# Patient Record
Sex: FEMALE | Race: WHITE | Employment: FULL TIME | URBAN - METROPOLITAN AREA
[De-identification: names, ages, dates, MRNs, and addresses within clinical notes are randomized per-mention and may not be internally consistent; named-entity substitution may affect disease eponyms.]

---

## 2017-06-19 ENCOUNTER — HOSPITAL ENCOUNTER (OUTPATIENT)
Dept: SLEEP MEDICINE | Age: 32
Discharge: HOME | End: 2017-06-19
Payer: COMMERCIAL

## 2017-06-19 ENCOUNTER — OFFICE VISIT (OUTPATIENT)
Dept: SLEEP MEDICINE | Age: 32
End: 2017-06-19

## 2017-06-19 VITALS
HEIGHT: 69 IN | WEIGHT: 293 LBS | HEART RATE: 90 BPM | SYSTOLIC BLOOD PRESSURE: 153 MMHG | OXYGEN SATURATION: 96 % | DIASTOLIC BLOOD PRESSURE: 87 MMHG | TEMPERATURE: 98.2 F | BODY MASS INDEX: 43.4 KG/M2

## 2017-06-19 DIAGNOSIS — E66.01 MORBID OBESITY WITH BMI OF 50.0-59.9, ADULT (HCC): ICD-10-CM

## 2017-06-19 DIAGNOSIS — G47.33 OBSTRUCTIVE SLEEP APNEA (ADULT) (PEDIATRIC): Primary | ICD-10-CM

## 2017-06-19 PROBLEM — J45.20 MILD INTERMITTENT ASTHMA WITHOUT COMPLICATION: Status: ACTIVE | Noted: 2017-06-19

## 2017-06-19 PROBLEM — F99 PSYCHIATRIC DISORDER: Status: ACTIVE | Noted: 2017-06-19

## 2017-06-19 PROCEDURE — 95806 SLEEP STUDY UNATT&RESP EFFT: CPT

## 2017-06-19 RX ORDER — LAMOTRIGINE 50 MG/1
100 TABLET, EXTENDED RELEASE ORAL DAILY
COMMUNITY

## 2017-06-19 RX ORDER — METFORMIN HYDROCHLORIDE 500 MG/1
TABLET ORAL 2 TIMES DAILY WITH MEALS
COMMUNITY
End: 2019-05-16 | Stop reason: CLARIF

## 2017-06-19 RX ORDER — TRAZODONE HYDROCHLORIDE 50 MG/1
TABLET ORAL
COMMUNITY
End: 2019-05-16 | Stop reason: CLARIF

## 2017-06-19 RX ORDER — ACETAMINOPHEN AND CODEINE PHOSPHATE 120; 12 MG/5ML; MG/5ML
SOLUTION ORAL
COMMUNITY
End: 2019-05-16 | Stop reason: CLARIF

## 2017-06-19 NOTE — PROGRESS NOTES
[address] [facility] [place]. [place], [address]  Tel.  [phone]  Fax. 100 [facility] 60  [place], [address] [facility]  Tel.  [phone]  Fax. [phone] [address] Denise Rae [address]  Tel.  [phone]  Fax. [phone]         Subjective:      Gabino Barahona is an 28 y.o. female referred by Deb Patel,  for evaluation for a sleep disorder. She complains of snoring, snorting, choking, periods of not breathing associated with excessive daytime sleepiness. Symptoms began 10 years ago, gradually worsening since that time. She usually can fall asleep in 30+ minutes. Family or house members note snoring, periods of not breathing. She denies falling asleep while driving. Gabino Barahona does wake up frequently at night. She is bothered by waking up too early and left unable to get back to sleep. She actually sleeps about 7 hours at night and wakes up about 3 times during the night. She does not work shifts:  .   Marlon Cerda indicates she does get too little sleep at night. Her bedtime is 2230. She awakens at 0700. She does take naps. She takes 7 naps a week lasting 30 to 45. She has the following observed behaviors: Loud snoring, Light snoring, Pauses in breathing, Head rocking or banging, Twitching of legs or feet;  . Other remarks: waking with a gasp or snort  She was diagnosed with sleep apnea 10 years ago but did not tolerate the CPAP as she felt that the masks gave her nightmares  [place] Sleepiness Score: 13   which reflect mild daytime drowsiness. No Known Allergies      Current Outpatient Prescriptions:     lamoTRIgine (LAMICTAL XR) 50 mg tr24 ER tablet, Take  by mouth daily. , Disp: , Rfl:     metFORMIN (GLUCOPHAGE) 500 mg tablet, Take  by mouth two (2) times daily (with meals). , Disp: , Rfl:     norethindrone (MICRONOR) 0.35 mg tab, Take  by mouth., Disp: , Rfl:     LEVOTHYROXINE SODIUM (LEVOTHYROXINE PO), Take  by mouth., Disp: , Rfl:     traZODone (DESYREL) 50 mg tablet, Take  by mouth nightly., Disp: , Rfl:     lurasidone (LATUDA) 40 mg tab tablet, Take  by mouth., Disp: , Rfl:     VITAMIN B COMPLEX (B-COMPLEX PO), Take  by mouth., Disp: , Rfl:     VIT B12/FOLIC/B6 MHAN/T2/ZCER (METHYL-MAX PO), Take  by mouth., Disp: , Rfl:      She  has a past medical history of ADHD (attention deficit hyperactivity disorder); PCOS (polycystic ovarian syndrome); Psychiatric disorder; and Thyroid activity decreased. She  has a past surgical history that includes abdomen surgery proc unlisted. She family history includes Asthma in her father; COPD in her father; Depression in her father and mother; Diabetes in her father and mother; Hypertension in her father. She  reports that she has quit smoking. She does not have any smokeless tobacco history on file. She reports that she drinks alcohol. Review of Systems:  Constitutional:  +weight gain. Eyes:  No blurred vision. CVS:  No significant chest pain  Pulm:  No significant shortness of breath  GI:  No significant nausea or vomiting  :  No significant nocturia  Musculoskeletal:  No significant joint pain at night  Skin:  No significant rashes  Neuro:  No significant dizziness   Psych: bipolar controlled    Sleep Review of Systems: notable for + difficulty falling asleep; +frequent awakenings at night;  regular dreaming noted; no nightmares ; no early morning headaches; no memory problems; no concentration issues; no history of any automobile or occupational accidents due to daytime drowsiness.       Objective:     Visit Vitals    /87    Pulse 90    Temp 98.2 °F (36.8 °C)    Ht 5' 9\" (1.753 m)    Wt (!) 385 lb (174.6 kg)    SpO2 96%    BMI 56.85 kg/m2         General:   Not in acute distress   Eyes:  Anicteric sclerae, no obvious strabismus   Nose:  No obvious nasal septum deviation    Oropharynx:   Class 3 oropharyngeal outlet, thick tongue base, enlarged and boggy uvula, low-lying soft palate, narrow tonsilo-pharyngeal pilars   Tonsils:   tonsils are present and normal   Neck:   Neck circ. in \"inches\": 17; midline trachea   Chest/Lungs:  Equal lung expansion, clear on auscultation    CVS:  Normal rate, regular rhythm; no JVD   Skin:  Warm to touch; no obvious rashes   Neuro:  No focal deficits ; no obvious tremor    Psych:  Normal affect,  normal countenance;          Assessment:       ICD-10-CM ICD-9-CM    1. Obstructive sleep apnea (adult) (pediatric) G47.33 327.23 SLEEP STUDY UNATTENDED, 4 CHANNEL   2. Morbid obesity with BMI of 50.0-59.9, adult ([facility]) E66.01 278.01     Z68.43 V85.43          Plan:     * The patient currently has a Moderate Risk for having sleep apnea. STOP-BANG score 5.  * PSG was ordered for initial evaluation. she  prefers a home sleep apnea test.  * She was provided information on sleep apnea including coresponding risk factors and the importance of proper treatment. * Counseling was provided regarding proper sleep hygiene and safe driving. * I will call her with the results. Treatment options for sleep apnea were reviewed. she is not against a trial of PAP if found to have significant sleep apnea. 2. Obesity - I have counseled the patient regarding the benefits of weight loss. Thank you for allowing us to participate in your patient's medical care. We'll keep you updated on these investigations.   Opal Girard MD  Diplomate in Sleep Medicine  [facility]

## 2017-06-19 NOTE — PATIENT INSTRUCTIONS
217 [facility] [place]. [place], [address]  Tel.  [phone]  Fax. 100 [facility] 60  [place], [address] [facility]  Tel.  [phone]  Fax. [phone] [address]  Tel.  [phone]  Fax. [phone]     Sleep Apnea: After Your Visit  Your Care Instructions  Sleep apnea occurs when you frequently stop breathing for 10 seconds or longer during sleep. It can be mild to severe, based on the number of times per hour that you stop breathing or have slowed breathing. Blocked or narrowed airways in your nose, mouth, or throat can cause sleep apnea. Your airway can become blocked when your throat muscles and tongue relax during sleep. Sleep apnea is common, occurring in 1 out of 20 individuals. Individuals having any of the following characteristics should be evaluated and treated right away due to high risk and detrimental consequences from untreated sleep apnea:  1. Obesity  2. Congestive Heart failure  3. Atrial Fibrillation  4. Uncontrolled Hypertension  5. Type II Diabetes  6. Night-time Arrhythmias  7. Stroke  8. Pulmonary Hypertension  9. High-risk Driving Populations (pilots, truck drivers, etc.)  10. Patients Considering Weight-loss Surgery    How do you know you have sleep apnea? You probably have sleep apnea if you answer 'yes' to 3 or more of the following questions:  S - Have you been told that you Snore? T - Are you often Tired during the day? O - Has anyone Observed you stop breathing while sleeping? P- Do you have (or are being treated for) high blood Pressure? B - Are you obese (Body Mass Index > 35)? A - Is your Age 48years old or older? N - Is your Neck size greater than 16 inches? G - Are you male Gender? A sleep physician can prescribe a breathing device that prevents tissues in the throat from blocking your airway.  Or your doctor may recommend using a dental device (oral breathing device) to help keep your airway open. In some cases, surgery may be needed to remove enlarged tissues in the throat. Follow-up care is a key part of your treatment and safety. Be sure to make and go to all appointments, and call your doctor if you are having problems. It's also a good idea to know your test results and keep a list of the medicines you take. How can you care for yourself at home? · Lose weight, if needed. It may reduce the number of times you stop breathing or have slowed breathing. · Go to bed at the same time every night. · Sleep on your side. It may stop mild apnea. If you tend to roll onto your back, sew a pocket in the back of your pajama top. Put a tennis ball into the pocket, and stitch the pocket shut. This will help keep you from sleeping on your back. · Avoid alcohol and medicines such as sleeping pills and sedatives before bed. · Do not smoke. Smoking can make sleep apnea worse. If you need help quitting, talk to your doctor about stop-smoking programs and medicines. These can increase your chances of quitting for good. · Prop up the head of your bed 4 to 6 inches by putting bricks under the legs of the bed. · Treat breathing problems, such as a stuffy nose, caused by a cold or allergies. · Use a continuous positive airway pressure (CPAP) breathing machine if lifestyle changes do not help your apnea and your doctor recommends it. The machine keeps your airway from closing when you sleep. · If CPAP does not help you, ask your doctor whether you should try other breathing machines. A bilevel positive airway pressure machine has two types of air pressureâone for breathing in and one for breathing out. Another device raises or lowers air pressure as needed while you breathe. · If your nose feels dry or bleeds when using one of these machines, talk with your doctor about increasing moisture in the air. A humidifier may help.   · If your nose is runny or stuffy from using a breathing machine, talk with your doctor about using decongestants or a corticosteroid nasal spray. When should you call for help? Watch closely for changes in your health, and be sure to contact your doctor if:  · You still have sleep apnea even though you have made lifestyle changes. · You are thinking of trying a device such as CPAP. · You are having problems using a CPAP or similar machine. Where can you learn more? Go to [employer]. Enter V046 in the search box to learn more about \"Sleep Apnea: After Your Visit. \"   © 8205-7811 Healthwise, Incorporated. Care instructions adapted under license by [facility] Patients Know Best (which disclaims liability or warranty for this information). This care instruction is for use with your licensed healthcare professional. If you have questions about a medical condition or this instruction, always ask your healthcare professional. Sharlette Keto any warranty or liability for your use of this information. PROPER SLEEP HYGIENE    What to avoid  · Do not have drinks with caffeine, such as coffee or black tea, for 8 hours before bed. · Do not smoke or use other types of tobacco near bedtime. Nicotine is a stimulant and can keep you awake. · Avoid drinking alcohol late in the evening, because it can cause you to wake in the middle of the night. · Do not eat a big meal close to bedtime. If you are hungry, eat a light snack. · Do not drink a lot of water close to bedtime, because the need to urinate may wake you up during the night. · Do not read or watch TV in bed. Use the bed only for sleeping and sexual activity. What to try  · Go to bed at the same time every night, and wake up at the same time every morning. Do not take naps during the day. · Keep your bedroom quiet, dark, and cool. · Get regular exercise, but not within 3 to 4 hours of your bedtime. .  · Sleep on a comfortable pillow and mattress.   · If watching the clock makes you anxious, turn it facing away from you so you cannot see the time. · If you worry when you lie down, start a worry book. Well before bedtime, write down your worries, and then set the book and your concerns aside. · Try meditation or other relaxation techniques before you go to bed. · If you cannot fall asleep, get up and go to another room until you feel sleepy. Do something relaxing. Repeat your bedtime routine before you go to bed again. · Make your house quiet and calm about an hour before bedtime. Turn down the lights, turn off the TV, log off the computer, and turn down the volume on music. This can help you relax after a busy day. Drowsy Driving  The [address] [place] [address] Road Traffic Safety Administration cites drowsiness as a causing factor in more than 873,686 police reported crashes annually, resulting in 76,000 injuries and 1,500 deaths. Other surveys suggest 55% of people polled have driven while drowsy in the past year, 23% had fallen asleep but not crashed, 3% crashed, and 2% had and accident due to drowsy driving. Who is at risk? Young Drivers: One study of drowsy driving accidents states that 55% of the drivers were under 25 years. Of those, 75% were male. Shift Workers and Travelers: People who work overnight or travel across time zones frequently are at higher risk of experiencing Circadian Rhythm Disorders. They are trying to work and function when their body is programed to sleep. Sleep Deprived: Lack of sleep has a serious impact on your ability to pay attention or focus on a task. Consistently getting less than the average of 8 hours your body needs creates partial or cumulative sleep deprivation. Untreated Sleep Disorders: Sleep Apnea, Narcolepsy, R.L.S., and other sleep disorders (untreated) prevent a person from getting enough restful sleep. This leads to excessive daytime sleepiness and increases the risk for drowsy driving accidents by up to 7 times.   Medications / Alcohol: Even over the counter medications can cause drowsiness. Medications that impair a drivers attention should have a warning label. Alcohol naturally makes you sleepy and on its own can cause accidents. Combined with excessive drowsiness its effects are amplified. Signs of Drowsy Driving:   * You don't remember driving the last few miles   * You may drift out of your jaden   * You are unable to focus and your thoughts wander   * You may yawn more often than normal   * You have difficulty keeping your eyes open / nodding off   * Missing traffic signs, speeding, or tailgating  Prevention-   Good sleep hygiene, lifestyle and behavioral choices have the most impact on drowsy driving. There is no substitute for sleep and the average person requires 8 hours nightly. If you find yourself driving drowsy, stop and sleep. Consider the sleep hygiene tips provided during your visit as well. Medication Refill Policy: Refills for all medications require 1 week advance notice. Please have your pharmacy fax a refill request. We are unable to fax, or call in \"controled substance\" medications and you will need to pick these prescriptions up from our office. Agile Activation    Thank you for requesting access to Agile. Please follow the instructions below to securely access and download your online medical record. Agile allows you to send messages to your doctor, view your test results, renew your prescriptions, schedule appointments, and more. How Do I Sign Up? 1. In your internet browser, go to [email or web address]. 2. Click on the First Time User? Click Here link in the Sign In box. You will see the New Member Sign Up page. 3. Enter your Agile Access Code exactly as it appears below. You will not need to use this code after youve completed the sign-up process. If you do not sign up before the expiration date, you must request a new code.     Agile Access Code: [email or web address]  Expires: 9/17/2017 12:01 PM (This is the date your Zumper access code will )    4. Enter the last four digits of your Social Security Number (xxxx) and Date of Birth (mm/dd/yyyy) as indicated and click Submit. You will be taken to the next sign-up page. 5. Create a "[employer]"t ID. This will be your "[employer]"t login ID and cannot be changed, so think of one that is secure and easy to remember. 6. Create a Zumper password. You can change your password at any time. 7. Enter your Password Reset Question and Answer. This can be used at a later time if you forget your password. 8. Enter your e-mail address. You will receive e-mail notification when new information is available in [address] 9. Click Sign Up. You can now view and download portions of your medical record. 10. Click the Download Summary menu link to download a portable copy of your medical information. Additional Information    If you have questions, please call [phone]. Remember, Zumper is NOT to be used for urgent needs. For medical emergencies, dial 380. [address] [place]. [place], [address]  Tel.  [phone]  Fax. 100 [facility] 60  [address]  Tel.  [phone]  Fax. [phone] 9250 [facility] [place] [address]  Tel.  [phone]  Fax. [phone]     Sleep Apnea: After Your Visit  Your Care Instructions  Sleep apnea occurs when you frequently stop breathing for 10 seconds or longer during sleep. It can be mild to severe, based on the number of times per hour that you stop breathing or have slowed breathing. Blocked or narrowed airways in your nose, mouth, or throat can cause sleep apnea. Your airway can become blocked when your throat muscles and tongue relax during sleep. Sleep apnea is common, occurring in 1 out of 20 individuals.   Individuals having any of the following characteristics should be evaluated and treated right away due to high risk and detrimental consequences from untreated sleep apnea:  11. Obesity  12. Congestive Heart failure  13. Atrial Fibrillation  14. Uncontrolled Hypertension  15. Type II Diabetes  16. Night-time Arrhythmias  17. Stroke  18. Pulmonary Hypertension  19. High-risk Driving Populations (pilots, truck drivers, etc.)  20. Patients Considering Weight-loss Surgery    How do you know you have sleep apnea? You probably have sleep apnea if you answer 'yes' to 3 or more of the following questions:  S - Have you been told that you Snore? T - Are you often Tired during the day? O - Has anyone Observed you stop breathing while sleeping? P- Do you have (or are being treated for) high blood Pressure? B - Are you obese (Body Mass Index > 35)? A - Is your Age 48years old or older? N - Is your Neck size greater than 16 inches? G - Are you male Gender? A sleep physician can prescribe a breathing device that prevents tissues in the throat from blocking your airway. Or your doctor may recommend using a dental device (oral breathing device) to help keep your airway open. In some cases, surgery may be needed to remove enlarged tissues in the throat. Follow-up care is a key part of your treatment and safety. Be sure to make and go to all appointments, and call your doctor if you are having problems. It's also a good idea to know your test results and keep a list of the medicines you take. How can you care for yourself at home? · Lose weight, if needed. It may reduce the number of times you stop breathing or have slowed breathing. · Go to bed at the same time every night. · Sleep on your side. It may stop mild apnea. If you tend to roll onto your back, sew a pocket in the back of your [employer] top. Put a tennis ball into the pocket, and stitch the pocket shut. This will help keep you from sleeping on your back. · Avoid alcohol and medicines such as sleeping pills and sedatives before bed. · Do not smoke. Smoking can make sleep apnea worse.  If you need help quitting, talk to your doctor about stop-smoking programs and medicines. These can increase your chances of quitting for good. · Prop up the head of your bed 4 to 6 inches by putting bricks under the legs of the bed. · Treat breathing problems, such as a stuffy nose, caused by a cold or allergies. · Use a continuous positive airway pressure (CPAP) breathing machine if lifestyle changes do not help your apnea and your doctor recommends it. The machine keeps your airway from closing when you sleep. · If CPAP does not help you, ask your doctor whether you should try other breathing machines. A bilevel positive airway pressure machine has two types of air pressureâone for breathing in and one for breathing out. Another device raises or lowers air pressure as needed while you breathe. · If your nose feels dry or bleeds when using one of these machines, talk with your doctor about increasing moisture in the air. A humidifier may help. · If your nose is runny or stuffy from using a breathing machine, talk with your doctor about using decongestants or a corticosteroid nasal spray. When should you call for help? Watch closely for changes in your health, and be sure to contact your doctor if:  · You still have sleep apnea even though you have made lifestyle changes. · You are thinking of trying a device such as CPAP. · You are having problems using a CPAP or similar machine. Where can you learn more? Go to RentStuff.com.be. Enter G924 in the search box to learn more about \"Sleep Apnea: After Your Visit. \"   © 9631-3142 Healthwise, Incorporated. Care instructions adapted under license by [employer] (which disclaims liability or warranty for this information).  This care instruction is for use with your licensed healthcare professional. If you have questions about a medical condition or this instruction, always ask your healthcare professional. Laneta Deven any warranty or liability for your use of this information. PROPER SLEEP HYGIENE    What to avoid  · Do not have drinks with caffeine, such as coffee or black tea, for 8 hours before bed. · Do not smoke or use other types of tobacco near bedtime. Nicotine is a stimulant and can keep you awake. · Avoid drinking alcohol late in the evening, because it can cause you to wake in the middle of the night. · Do not eat a big meal close to bedtime. If you are hungry, eat a light snack. · Do not drink a lot of water close to bedtime, because the need to urinate may wake you up during the night. · Do not read or watch TV in bed. Use the bed only for sleeping and sexual activity. What to try  · Go to bed at the same time every night, and wake up at the same time every morning. Do not take naps during the day. · Keep your bedroom quiet, dark, and cool. · Get regular exercise, but not within 3 to 4 hours of your bedtime. .  · Sleep on a comfortable pillow and mattress. · If watching the clock makes you anxious, turn it facing away from you so you cannot see the time. · If you worry when you lie down, start a worry book. Well before bedtime, write down your worries, and then set the book and your concerns aside. · Try meditation or other relaxation techniques before you go to bed. · If you cannot fall asleep, get up and go to another room until you feel sleepy. Do something relaxing. Repeat your bedtime routine before you go to bed again. · Make your house quiet and calm about an hour before bedtime. Turn down the lights, turn off the TV, log off the computer, and turn down the volume on music. This can help you relax after a busy day. Drowsy Driving  The [address] [place] [address] Road Traffic Safety Administration cites drowsiness as a causing factor in more than 369,064 police reported crashes annually, resulting in 76,000 injuries and 1,500 deaths.  Other surveys suggest 55% of people polled have driven while drowsy in the past year, 23% had fallen asleep but not crashed, 3% crashed, and 2% had and accident due to drowsy driving. Who is at risk? Young Drivers: One study of drowsy driving accidents states that 55% of the drivers were under 25 years. Of those, 75% were male. Shift Workers and Travelers: People who work overnight or travel across time zones frequently are at higher risk of experiencing Circadian Rhythm Disorders. They are trying to work and function when their body is programed to sleep. Sleep Deprived: Lack of sleep has a serious impact on your ability to pay attention or focus on a task. Consistently getting less than the average of 8 hours your body needs creates partial or cumulative sleep deprivation. Untreated Sleep Disorders: Sleep Apnea, Narcolepsy, R.L.S., and other sleep disorders (untreated) prevent a person from getting enough restful sleep. This leads to excessive daytime sleepiness and increases the risk for drowsy driving accidents by up to 7 times. Medications / Alcohol: Even over the counter medications can cause drowsiness. Medications that impair a drivers attention should have a warning label. Alcohol naturally makes you sleepy and on its own can cause accidents. Combined with excessive drowsiness its effects are amplified. Signs of Drowsy Driving:   * You don't remember driving the last few miles   * You may drift out of your jaden   * You are unable to focus and your thoughts wander   * You may yawn more often than normal   * You have difficulty keeping your eyes open / nodding off   * Missing traffic signs, speeding, or tailgating  Prevention-   Good sleep hygiene, lifestyle and behavioral choices have the most impact on drowsy driving. There is no substitute for sleep and the average person requires 8 hours nightly. If you find yourself driving drowsy, stop and sleep. Consider the sleep hygiene tips provided during your visit as well.      Medication Refill Policy: Refills for all medications require 1 week advance notice. Please have your pharmacy fax a refill request. We are unable to fax, or call in \"controled substance\" medications and you will need to pick these prescriptions up from our office. MyChariVengo Activation    Thank you for requesting access to Scivantage. Please follow the instructions below to securely access and download your online medical record. Scivantage allows you to send messages to your doctor, view your test results, renew your prescriptions, schedule appointments, and more. How Do I Sign Up?    11. In your internet browser, go to [email or web address]. 12. Click on the First Time User? Click Here link in the Sign In box. You will see the New Member Sign Up page. 15. Enter your Scivantage Access Code exactly as it appears below. You will not need to use this code after youve completed the sign-up process. If you do not sign up before the expiration date, you must request a new code. Scivantage Access Code: [email or web address]  Expires: 2017 12:01 PM (This is the date your Scivantage access code will )    14. Enter the last four digits of your Social Security Number (xxxx) and Date of Birth (mm/dd/yyyy) as indicated and click Submit. You will be taken to the next sign-up page. 15. Create a Scivantage ID. This will be your Scivantage login ID and cannot be changed, so think of one that is secure and easy to remember. 12. Create a Scivantage password. You can change your password at any time. 16. Enter your Password Reset Question and Answer. This can be used at a later time if you forget your password. 25. Enter your e-mail address. You will receive e-mail notification when new information is available in [address] 19. Click Sign Up. You can now view and download portions of your medical record. 20. Click the Download Summary menu link to download a portable copy of your medical information.     Additional Information    If you have questions, please call [phone]. Remember, MyChart is NOT to be used for urgent needs. For medical emergencies, dial 911.

## 2017-06-20 ENCOUNTER — TELEPHONE (OUTPATIENT)
Dept: SLEEP MEDICINE | Age: 32
End: 2017-06-20

## 2017-06-20 NOTE — TELEPHONE ENCOUNTER
HSAT Returned-[facility]  Night One  Date of Study: 6/19/17    The following information was gathered from the patients study log:    · Lights off: 10:30PM  · Estimated sleep onset: 11:30PM    · Awakened a total of 3 times  · The patient felt they slept 6.5 hours  · Patient took none before starting the test  · Sleep quality was worse compared to a usual nights sleep.     Further information provided: I drifted in and out of sleep many times but only woke up to look at my phone to check on the time at 2:50am, 5:30Am and 6:30AM. I did dream that I had oxygen tubes in my nose and something pressing on my chest.

## 2017-06-21 ENCOUNTER — TELEPHONE (OUTPATIENT)
Dept: SLEEP MEDICINE | Age: 32
End: 2017-06-21

## 2017-06-21 ENCOUNTER — DOCUMENTATION ONLY (OUTPATIENT)
Dept: SLEEP MEDICINE | Age: 32
End: 2017-06-21

## 2017-06-21 DIAGNOSIS — G47.33 OBSTRUCTIVE SLEEP APNEA (ADULT) (PEDIATRIC): Primary | ICD-10-CM

## 2017-06-21 NOTE — TELEPHONE ENCOUNTER
The results of her home sleep study were reviewed. The results were positive for significant sleep disordered breathing. Treatment options were reviewed in detail. she would like to proceed with PAP therapy. I have placed an order for APAP. she will be seen in follow-up in 6 weeks to gauge treatment response and adherence to therapy. All of her questions were addressed. Use Beeminder and ANNETTE program. She will need PAP support.    2 week download and call by lead tech

## 2017-06-21 NOTE — TELEPHONE ENCOUNTER
A voice mail message was left on 6/21/17 for patient to schedule PAP adherence appointment.  Also to give dme and contact information

## 2017-07-25 ENCOUNTER — HOSPITAL ENCOUNTER (OUTPATIENT)
Dept: ULTRASOUND IMAGING | Age: 32
Discharge: HOME | End: 2017-07-25
Payer: COMMERCIAL

## 2017-07-25 DIAGNOSIS — R60.9 SWELLING: ICD-10-CM

## 2017-07-25 DIAGNOSIS — R20.8 BURNING SENSATION: ICD-10-CM

## 2017-07-25 PROCEDURE — 76705 ECHO EXAM OF ABDOMEN: CPT

## 2019-05-16 RX ORDER — SERTRALINE HYDROCHLORIDE 50 MG/1
200 TABLET, FILM COATED ORAL DAILY
COMMUNITY

## 2019-05-16 RX ORDER — DEXTROAMPHETAMINE SACCHARATE, AMPHETAMINE ASPARTATE MONOHYDRATE, DEXTROAMPHETAMINE SULFATE AND AMPHETAMINE SULFATE 2.5; 2.5; 2.5; 2.5 MG/1; MG/1; MG/1; MG/1
20 CAPSULE, EXTENDED RELEASE ORAL
COMMUNITY

## 2019-05-16 RX ORDER — LEVOCETIRIZINE DIHYDROCHLORIDE 2.5 MG/5ML
SOLUTION ORAL
COMMUNITY

## 2019-05-16 RX ORDER — MONTELUKAST SODIUM 10 MG/1
10 TABLET ORAL DAILY
COMMUNITY

## 2019-05-16 NOTE — PERIOP NOTES
[facility] Preoperative Instructions Surgery Date 5/24/19          Time of Arrival 1300 1. On the day of your surgery, please report to the Surgical Services Registration Desk and sign in at your designated time. The Surgery Center is located to the right of the Emergency Room. 2. You must have someone with you to drive you home. You should not drive a car for 24 hours following surgery. Please make arrangements for a friend or family member to stay with you for the first 24 hours after your surgery. 3. Do not have anything to eat or drink (including water, gum, mints, coffee, juice) after midnight 5/23/19.?? [place] ? This may not apply to medications prescribed by your physician. ?(Please note below the special instructions with medications to take the morning of your procedure.) 4. We recommend you do not drink any alcoholic beverages for 24 hours before and after your surgery. 5. Contact your surgeons office for instructions on the following medications: non-steroidal anti-inflammatory drugs (i.e. Advil, Aleve), vitamins, and supplements. (Some surgeons will want you to stop these medications prior to surgery and others may allow you to take them) **If you are currently taking Plavix, Coumadin, Aspirin and/or other blood-thinning agents, contact your surgeon for instructions. ** Your surgeon will partner with the physician prescribing these medications to determine if it is safe to stop or if you need to continue taking. Please do not stop taking these medications without instructions from your surgeon 6. Wear comfortable clothes. Wear glasses instead of contacts. Do not bring any money or jewelry. Please bring picture ID, insurance card, and any prearranged co-payment or hospital payment. Do not wear make-up, particularly mascara the morning of your surgery. Do not wear nail polish, particularly if you are having foot /hand surgery.   Wear your hair loose or down, no ponytails, buns, kameron pins or clips. All body piercings must be removed. Please shower with antibacterial soap for three consecutive days before and on the morning of surgery, but do not apply any lotions, powders or deodorants after the shower on the day of surgery. Please use a fresh towels after each shower. Please sleep in clean clothes and change bed linens the night before surgery. Please do not shave for 48 hours prior to surgery. Shaving of the face is acceptable. 7. You should understand that if you do not follow these instructions your surgery may be cancelled. If your physical condition changes (I.e. fever, cold or flu) please contact your surgeon as soon as possible. 8. It is important that you be on time. If a situation occurs where you may be late, please call [phone] (OR Holding Area). 9. If you have any questions and or problems, please call [phone] (Pre-admission Testing). 10. Your surgery time may be subject to change. You will receive a phone call the evening prior if your time changes. 11.  If having outpatient surgery, you must have someone to drive you here, stay with you during the duration of your stay, and to drive you home at time of discharge. 12.   In an effort to improve the efficiency, privacy, and safety for all of our Pre-op patients visitors are not allowed in the Holding area. Once you arrive and are registered your family/visitors will be asked to remain in the waiting room. The Pre-op staff will get you from the Surgical Waiting Area and will explain to you and your family/visitors that the Pre-op phase is beginning. The staff will answer any questions and provide instructions for tracking of the patient, by use of the existing tracking number and color-coded status board in the waiting room.   At this time the staff will also ask for your designated spokesperson information in the event that the physician or staff need to provide an update or obtain any pertinent information. The designated spokesperson will be notified if the physician needs to speak to family during the pre-operative phase. If at any time your family/visitors has questions or concerns they may approach the volunteer desk in the waiting area for assistance. Special Instructions:none MEDICATIONS TO TAKE THE MORNING OF SURGERY WITH A SIP OF WATER:adderall,lamictal,xyzal,singulair,zoloft. I understand a pre-operative phone call will be made to verify my surgery time. In the event that I am not available, I give permission for a message to be left on my answering service and/or with another person? {yes @ [phone]. 
 
 
 
 ___________________      __________   _________ 
  (Signature of Patient)             (Witness)                (Date and Time)

## 2019-05-21 NOTE — H&P
Pre-operative Evaluation / History & Physical    Sent From: Sent To: [address]   [facility]   Phone: [phone] Fax: [phone]      Patient Information  Patient Name Arlet KENDALL    1985 Age 34yo   Address [place] [address], [address] [facility] [place] Phone H: [phone]  M: [phone]   [place] [employer] [facility]  ID: [id number]  Group: [id number]  Policy Chiang: [address]  ID: [id number]  Group: [id number]  Policy Chiang: Laurent Conley     Pre-Op Visit and Medical History  Chief Complaint sis ultrasound   History of Present Illness 29year old presents today for ultrasound SIS. She was referred over by [facility] after beginning emegace for heavy menstrual bleeding that had been ongoing for some time. Her bleeding has finally started to be controlled with the megace, but she desires intervention to keep this from happening again. She expresses concerns about an IUD or birth control pills as long term options. Her SIS demonstrates a possible polyp and much debris present near the fundus. Endometrial biopsy was disordered/proliferative. Past Medical History Past Medical History not reviewed (last reviewed 2019)  ADHD: Y  Anxiety Disorder: Y  Bi-Polar: Y  Depression: Y  Hypertension (high blood pressure): Y  Hypothyroidism: Y   Surgical History Surgical History not reviewed (last reviewed 2019)     Biopsy - uterine biopsy   Dilation and Curettage   Surgical procedure - spinxter surgery   Pancreas surgery procedure - stints in pancreas   Cholecystectomy (Gallbladder)   Procedure on urethra - urethra widened   Elbow arthroscopy/surgery - right elbow surgery   Gynecological / Obstetrical History Reviewed GYN History  Date of LMP: (Notes: menses since , severe bleeding yesterday). Menses Monthly: Y. Date of Last Pap Smear: 2017 (Notes: NIL).   Date of HPV testin2017 (Notes: negative). Abnormal Pap: N.  HPV Vaccine: N. Sexually Active?: Y.  STIs/STDs: N.  Current Birth Control Method: BCPs. PCOS: Y.  4/3/19-negative endo bx   Social History Social History not reviewed (last reviewed 2019)  OB/GYN Social History  Smoking Status: Former smoker  Marital status:   Occupation: Admin   Family History Family History not reviewed (last reviewed 2019)    Mother - Blood coagulation disorder                      - Diabetes mellitus     - Malignant melanoma   Father - Diabetes mellitus     - Malignant melanoma     - Blood coagulation disorder                    Maternal Grandfather - Malignant melanoma   Maternal Grandmother                  - Cerebrovascular accident   Paternal Grandfather - Heart disease     - Drug addict   Paternal Grandmother - Heart disease      Allergies List Reviewed Allergies     DEMEROL       Medications Reviewed Medications     Adderall 19   entered    LaMICtal ODT Starter (Green) 50 mg (42)-100 mg (14) tablet,disintegrat  Internal Note: Entered By: Ana Velazquez MA - Team 2 SHPSigned By: Johana Cee MDUncoded: NBMN: N, start 2017   filled    Latuda 40 mg tablet  Internal Note: Entered By: Ana Velazquez MA - Team 2 SHPSigned By: Johana Cee MDUncoded: NBMN: N, start 2017   filled    levothyroxine 50 mcg tablet  Prescribed by ezequiel 603/566 Karel Paredes MD  Internal Note: Entered By: Serena Galvez MA - Team 3 SHPSigned By: Johana Cee MDUncoded: NBMN: N, start 10/30/2017 10/30/17   filled    megestrol 40 mg tablet  1 tablet twice daily for 5 days then 1 tablet daily for 5 days 19   prescribed                     Saxenda 3 mg/0.5 mL (18 mg/3 mL) subcutaneous pen injector  Internal Note: Entered By: Mona Ewing MA - Team 3 SHPSigned By: Johana Cee MDUncoded: NBMN: N, start 2019   filled    Singulair 10 mg tablet  Take 1 tablet(s) every day by oral route.  19 entered    Xyzal 04/03/19   entered    Zoloft 100 mg tablet  Take 2 tablet(s) every day by oral route. 04/03/19   entered       Review of Systems ROS as noted in the HPI   Vital Signs 05/01/2019 09:56 am  Ht:                  5 ft 9 in (175.26 cm) Wt:                  354 lbs (160.57 kg) BMI:                  52.3   BP:                  Not Performed          Physical Exam Patient is a 79-year-old female. Constitutional: General Appearance: well developed and nourished and pleasant. Level of Distress: no acute distress. Ambulation: ambulating normally. Head: Head: normocephalic. Eyes: Extraocular Movements extraocular movements intact. Ears, Nose, Mouth, Throat: Ears normal hearing. Nose: no external nose lesions. Neck: Appearance supple, trachea midline, and no neck masses. Thyroid: no enlargement or nodules and non-tender. Lungs / Chest: Respiratory effort: unlabored. Inspection / Palpation: no deformity, tenderness, or swelling. Cardiovascular: Extremities: no clubbing, cyanosis, or edema. Abdomen: Inspection and Palpation: no tenderness, guarding, masses, rebound tenderness, or CVA tenderness and soft and non-distended. Liver: non-tender; no masses. Spleen: no masses. Hernia: none palpable. Female : Chaperone: present. External genitalia: no lesions, rash, or erythema. Vagina: no discharge, mass, or tenderness. Cervix: no discharge or cervical lesion. Uterus: midline, non-tender, no mass, and not enlarged. Adnexae: no adnexal mass or tenderness. Bladder and Urethra: no urethral discharge or mass. Lymphatics: Inguinal no inguinal lymphadenopathy. Extremities: Extremities: no swelling or inflammation of extremities. Musculoskeletal System: General Musculoskeletal full range of motion. Lumbar / Lumbosacral Spine no spasms. Skin: General Skin no rash or suspicious lesions. Neurologic: Gait and Station: normal gait. Sensation: grossly intact.  Motor: grossly intact. Mental Status Exam: Orientation oriented to person, place, and time. Lab Results    Assessment and Plan 1. Polyp of corpus uteri -  We discussed the fact that this anatomic lesion may be the source of her heavy menstrual bleeding, which is relatively recent in onset. Although she desires hysterectomy, we discussed that her weight does present a not insignificant risk for major surgery and if we can achieve the desired goal with hysteroscopic measures, we may be better off and safer. We also discussed that obesity may have something to do with ehr heavy bleeding as well, but that for now, we have an anatomic target and we should focus on that before looking into more hormonal causes of her bleeding. We will plan hysteroscopic polypectomy and D&C. I reviewed with the patient indications, alternatives, risks, and benefits of proposed procedure, the risks of which include uterine perforation, subsequent injury to bowel, bladder, nerves, blood vessels, or ureters, injury to any other intraabdominal structure, risk of bleeding and infection, and inherent risks of anesthesia, including death. The patient indicates understanding of these risks, and agrees to the proposed procedure. She is instructed to stay NPO after midnight the night before surgery.   N84.0: Polyp of corpus uteri      Return to Office   Jessica Longoria MD for Surgery at [facility] ([facility]) on 05/24/2019 at 03:00 PM   Jessica Longoria MD for Established Patient at [facility] Office on 07/03/2019 at 07:30 AM   Current Problems (Diagnoses) Reviewed Problems     Obesity - Onset: 05/18/2017 - Entered By: Alison Perez MA - Team 2 SHPSigned By: Rochelle Munoz MD Description: Obesity (BMI > 29.99) code: 278.00   Polyp of corpus uteri - Onset: 05/01/2019   Dysfunctional uterine bleeding - Onset: 05/18/2017 - Entered By: Rochelle Munoz MDSigned By: Rochelle Munoz MD Description: Dysfunctional uterine bleeding code: 80.9   Family history of malignant neoplasm of ovary - Onset: 2018 - Entered By: Scott Mai MDSigned By: Scott Mai MD Description: Family Hx ovarian CA code: O44.05   Oral contraception - Onset: 10/30/2017 - Entered By: Scott CARLSONigned By: Scott Mai MD Description: Oral contraceptive pills follow up code: V25.41   Gynecological examination abnormal - Onset: 2018 - Entered By: Scott CARLSONigned By: Scott Mai MD Description: Routine GYN with problems code: V72.31         Electronically Signed by: Jason Trejo MD    _____________________________________________   Ordered/Documented by:   Visit Date: 2019                 Pre-operative Evaluation / History & Physical    Sent From: Sent To: [address]   Phone: [phone] Fax: [phone]      Patient Information  Patient Name Rebeca Godoy F    1985 Age 36yo   Address [place] [address] [address] [place] [address] Phone H: [phone]  M: [phone]   [place] [facility]  ID: [id number]  Group: [id number]  Policy [place]: [address]  ID: [id number]  Group: [id number]  Policy Chiang: Maddie Fair     Pre-Op Visit and Medical History  Chief Complaint sis ultrasound   History of Present Illness 29year old presents today for ultrasound SIS. She was referred over by Eric [facility] after beginning emegace for heavy menstrual bleeding that had been ongoing for some time. Her bleeding has finally started to be controlled with the megace, but she desires intervention to keep this from happening again. She expresses concerns about an IUD or birth control pills as long term options. Her SIS demonstrates a possible polyp and much debris present near the fundus. Endometrial biopsy was disordered/proliferative.    Past Medical History Past Medical History not reviewed (last reviewed 2019)  ADHD: Y  Anxiety Disorder: Y  Bi-Polar: Y  Depression: Y  Hypertension (high blood pressure): Y  Hypothyroidism: Y   Surgical History Surgical History not reviewed (last reviewed 2019)     Biopsy - uterine biopsy   Dilation and Curettage   Surgical procedure - spinxter surgery   Pancreas surgery procedure - stints in pancreas   Cholecystectomy (Gallbladder)   Procedure on urethra - urethra widened   Elbow arthroscopy/surgery - right elbow surgery   Gynecological / Obstetrical History Reviewed GYN History  Date of LMP: (Notes: menses since , severe bleeding yesterday). Menses Monthly: Y. Date of Last Pap Smear: 2017 (Notes: NIL). Date of HPV testin2017 (Notes: negative). Abnormal Pap: N.  HPV Vaccine: N. Sexually Active?: Y.  STIs/STDs: N.  Current Birth Control Method: BCPs.   PCOS: Y.  4/3/19-negative endo bx   Social History Social History not reviewed (last reviewed 2019)  OB/GYN Social History  Smoking Status: Former smoker  Marital status:   Occupation: Admin   Family History Family History not reviewed (last reviewed 2019)    Mother - Blood coagulation disorder                      - Diabetes mellitus     - Malignant melanoma   Father - Diabetes mellitus     - Malignant melanoma     - Blood coagulation disorder                    Maternal Grandfather - Malignant melanoma   Maternal Grandmother                  - Cerebrovascular accident   Paternal Grandfather - Heart disease     - Drug addict   Paternal Grandmother - Heart disease      Allergies List Reviewed Allergies     DEMEROL       Medications Reviewed Medications     Adderall 19   entered    LaMICtal ODT Starter (Green) 50 mg (42)-100 mg (14) tablet,disintegrat  Internal Note: Entered By: Rafa Simon MA - Team 2 SHPSigned By: Enzo Rodríguez MDUncoded: NBMN: N, start 2017   filled    Latuda 40 mg tablet  Internal Note: Entered By: Rafa Simon MA - Team 2 SHPSigned By: Philly Reid KCassidy MDUncoded: NBMN: N, start 05/18/2017 05/18/17   filled    levothyroxine 50 mcg tablet  Prescribed by non 606/706 Karel Paredes MD  Internal Note: Entered By: Tomás York MA - Team 3 SHPSigned By: Darlin Montgomery MDUncoded: NBMN: N, start 10/30/2017 10/30/17   filled    megestrol 40 mg tablet  1 tablet twice daily for 5 days then 1 tablet daily for 5 days 04/26/19   prescribed                     Saxenda 3 mg/0.5 mL (18 mg/3 mL) subcutaneous pen injector  Internal Note: Entered By: Leopold Presser MA - Team 3 SHPSigned By: Darlin Montgomery MDUncoded: NBMN: N, start 04/03/2019 04/03/19   filled    Singulair 10 mg tablet  Take 1 tablet(s) every day by oral route. 04/03/19   entered    Xyzal 04/03/19   entered    Zoloft 100 mg tablet  Take 2 tablet(s) every day by oral route. 04/03/19   entered       Review of Systems ROS as noted in the HPI   Vital Signs 05/01/2019 09:56 am  Ht:                  5 ft 9 in (175.26 cm) Wt:                  354 lbs (160.57 kg) BMI:                  52.3   BP:                  Not Performed          Physical Exam Patient is a 29-year-old female. Constitutional: General Appearance: well developed and nourished and pleasant. Level of Distress: no acute distress. Ambulation: ambulating normally. Head: Head: normocephalic. Eyes: Extraocular Movements extraocular movements intact. Ears, Nose, Mouth, Throat: Ears normal hearing. Nose: no external nose lesions. Neck: Appearance supple, trachea midline, and no neck masses. Thyroid: no enlargement or nodules and non-tender. Lungs / Chest: Respiratory effort: unlabored. Inspection / Palpation: no deformity, tenderness, or swelling. Cardiovascular: Extremities: no clubbing, cyanosis, or edema. Abdomen: Inspection and Palpation: no tenderness, guarding, masses, rebound tenderness, or CVA tenderness and soft and non-distended. Liver: non-tender; no masses. Spleen: no masses. Hernia: none palpable.                   Female : Chaperone: present. External genitalia: no lesions, rash, or erythema. Vagina: no discharge, mass, or tenderness. Cervix: no discharge or cervical lesion. Uterus: midline, non-tender, no mass, and not enlarged. Adnexae: no adnexal mass or tenderness. Bladder and Urethra: no urethral discharge or mass. Lymphatics: Inguinal no inguinal lymphadenopathy. Extremities: Extremities: no swelling or inflammation of extremities. Musculoskeletal System: General Musculoskeletal full range of motion. Lumbar / Lumbosacral Spine no spasms. Skin: General Skin no rash or suspicious lesions. Neurologic: Gait and Station: normal gait. Sensation: grossly intact. Motor: grossly intact. Mental Status Exam: Orientation oriented to person, place, and time. Lab Results    Assessment and Plan 1. Polyp of corpus uteri -  We discussed the fact that this anatomic lesion may be the source of her heavy menstrual bleeding, which is relatively recent in onset. Although she desires hysterectomy, we discussed that her weight does present a not insignificant risk for major surgery and if we can achieve the desired goal with hysteroscopic measures, we may be better off and safer. We also discussed that obesity may have something to do with ehr heavy bleeding as well, but that for now, we have an anatomic target and we should focus on that before looking into more hormonal causes of her bleeding. We will plan hysteroscopic polypectomy and D&C. I reviewed with the patient indications, alternatives, risks, and benefits of proposed procedure, the risks of which include uterine perforation, subsequent injury to bowel, bladder, nerves, blood vessels, or ureters, injury to any other intraabdominal structure, risk of bleeding and infection, and inherent risks of anesthesia, including death. The patient indicates understanding of these risks, and agrees to the proposed procedure.  She is instructed to stay NPO after midnight the night before surgery. N84.0: Polyp of corpus uteri      Return to Office   Marianne Sheikh MD for Surgery at [facility] ([place]) on 05/24/2019 at 03:00 PM   Marianne Sheikh MD for Established Patient at [facility] Office on 07/03/2019 at 07:30 AM   Current Problems (Diagnoses) Reviewed Problems     Obesity - Onset: 05/18/2017 - Entered By: Katerina Del Cid MA - Team 2 SHPSigned By: Joshua Allison MD Description: Obesity (BMI > 29.99) code: 278.00   Polyp of corpus uteri - Onset: 05/01/2019   Dysfunctional uterine bleeding - Onset: 05/18/2017 - Entered By: Joshua Allison MDSigned By: Joshua Allison MD Description: Dysfunctional uterine bleeding code: 80.9   Family history of malignant neoplasm of ovary - Onset: 05/07/2018 - Entered By: Joshua CARLSONigned By: Joshua Allison MD Description: Family Hx ovarian CA code: T80.82   Oral contraception - Onset: 10/30/2017 - Entered By: Joshua Allison MDSigned By: Joshua Allison MD Description: Oral contraceptive pills follow up code: V25.41   Gynecological examination abnormal - Onset: 05/07/2018 - Entered By: Joshua Allison MDSigned By: Joshua Allison MD Description: Routine GYN with problems code: V72.31         Electronically Signed by: Taniya Dwyer MD    _____________________________________________   Ordered/Documented by:   Visit Date: 05/01/2019    The History and Physical is reviewed today. The patient is seen and examined and no changes are required.   Pradip Guzman MD  5/24/2019  2:40 PM

## 2019-05-23 ENCOUNTER — ANESTHESIA EVENT (OUTPATIENT)
Dept: SURGERY | Age: 34
End: 2019-05-23
Payer: COMMERCIAL

## 2019-05-23 NOTE — ANESTHESIA PREPROCEDURE EVALUATION
Relevant Problems   No relevant active problems       Anesthetic History   No history of anesthetic complications            Review of Systems / Medical History  Patient summary reviewed, nursing notes reviewed and pertinent labs reviewed    Pulmonary        Sleep apnea: No treatment    Asthma : well controlled    Comments: Former smoker   Neuro/Psych         Psychiatric history    Comments: Bipolar Disorder  ADHD Cardiovascular    Hypertension: well controlled              Exercise tolerance: >4 METS     GI/Hepatic/Renal     GERD: well controlled           Endo/Other      Hypothyroidism: well controlled  Morbid obesity    Comments: PCOS Other Findings   Comments: Menorrhagia  Endometrial Polyps         Physical Exam    Airway  Mallampati: II  TM Distance: 4 - 6 cm  Neck ROM: normal range of motion   Mouth opening: Normal     Cardiovascular  Regular rate and rhythm,  S1 and S2 normal,  no murmur, click, rub, or gallop             Dental  No notable dental hx       Pulmonary  Breath sounds clear to auscultation               Abdominal  GI exam deferred       Other Findings            Anesthetic Plan    ASA: 3  Anesthesia type: general          Induction: Intravenous  Anesthetic plan and risks discussed with: Patient

## 2019-05-24 ENCOUNTER — ANESTHESIA (OUTPATIENT)
Dept: SURGERY | Age: 34
End: 2019-05-24
Payer: COMMERCIAL

## 2019-05-24 ENCOUNTER — HOSPITAL ENCOUNTER (OUTPATIENT)
Age: 34
Setting detail: OUTPATIENT SURGERY
Discharge: HOME | End: 2019-05-24
Attending: OBSTETRICS & GYNECOLOGY | Admitting: OBSTETRICS & GYNECOLOGY
Payer: COMMERCIAL

## 2019-05-24 VITALS
HEIGHT: 70 IN | DIASTOLIC BLOOD PRESSURE: 58 MMHG | WEIGHT: 293 LBS | TEMPERATURE: 98.3 F | SYSTOLIC BLOOD PRESSURE: 127 MMHG | HEART RATE: 79 BPM | BODY MASS INDEX: 41.95 KG/M2 | RESPIRATION RATE: 18 BRPM | OXYGEN SATURATION: 97 %

## 2019-05-24 DIAGNOSIS — N84.0 ENDOMETRIAL POLYP: ICD-10-CM

## 2019-05-24 DIAGNOSIS — N92.4 EXCESSIVE BLEEDING IN PREMENOPAUSAL PERIOD: Primary | ICD-10-CM

## 2019-05-24 PROBLEM — N92.0 MENORRHAGIA: Status: ACTIVE | Noted: 2019-05-24

## 2019-05-24 LAB — HCG UR QL: NEGATIVE

## 2019-05-24 PROCEDURE — 77030021678 HC GLIDESCP STAT DISP VERT -B: Performed by: NURSE ANESTHETIST, CERTIFIED REGISTERED

## 2019-05-24 PROCEDURE — 77030018602 HC TBNG LAPSCP1 STOR -B: Performed by: OBSTETRICS & GYNECOLOGY

## 2019-05-24 PROCEDURE — 77030005537 HC CATH URETH BARD -A: Performed by: OBSTETRICS & GYNECOLOGY

## 2019-05-24 PROCEDURE — 77030037417 HC DEV TISS RMVL HOLO -H: Performed by: OBSTETRICS & GYNECOLOGY

## 2019-05-24 PROCEDURE — 74011250636 HC RX REV CODE- 250/636

## 2019-05-24 PROCEDURE — 74011250636 HC RX REV CODE- 250/636: Performed by: OBSTETRICS & GYNECOLOGY

## 2019-05-24 PROCEDURE — 74011250637 HC RX REV CODE- 250/637: Performed by: ANESTHESIOLOGY

## 2019-05-24 PROCEDURE — 76010000138 HC OR TIME 0.5 TO 1 HR: Performed by: OBSTETRICS & GYNECOLOGY

## 2019-05-24 PROCEDURE — 76060000032 HC ANESTHESIA 0.5 TO 1 HR: Performed by: OBSTETRICS & GYNECOLOGY

## 2019-05-24 PROCEDURE — 77030018604 HC TBNG LAPSCP4 STOR -G: Performed by: OBSTETRICS & GYNECOLOGY

## 2019-05-24 PROCEDURE — 74011250636 HC RX REV CODE- 250/636: Performed by: ANESTHESIOLOGY

## 2019-05-24 PROCEDURE — 77030032490 HC SLV COMPR SCD KNE COVD -B: Performed by: OBSTETRICS & GYNECOLOGY

## 2019-05-24 PROCEDURE — 77030008684 HC TU ET CUF COVD -B: Performed by: NURSE ANESTHETIST, CERTIFIED REGISTERED

## 2019-05-24 PROCEDURE — 81025 URINE PREGNANCY TEST: CPT

## 2019-05-24 PROCEDURE — 74011000250 HC RX REV CODE- 250: Performed by: OBSTETRICS & GYNECOLOGY

## 2019-05-24 PROCEDURE — 74011000250 HC RX REV CODE- 250

## 2019-05-24 PROCEDURE — 77030019908 HC STETH ESOPH SIMS -A: Performed by: NURSE ANESTHETIST, CERTIFIED REGISTERED

## 2019-05-24 PROCEDURE — 88305 TISSUE EXAM BY PATHOLOGIST: CPT

## 2019-05-24 PROCEDURE — 76210000006 HC OR PH I REC 0.5 TO 1 HR: Performed by: OBSTETRICS & GYNECOLOGY

## 2019-05-24 PROCEDURE — 74011000258 HC RX REV CODE- 258: Performed by: OBSTETRICS & GYNECOLOGY

## 2019-05-24 PROCEDURE — 76210000021 HC REC RM PH II 0.5 TO 1 HR: Performed by: OBSTETRICS & GYNECOLOGY

## 2019-05-24 PROCEDURE — 77030011640 HC PAD GRND REM COVD -A: Performed by: OBSTETRICS & GYNECOLOGY

## 2019-05-24 RX ORDER — MIDAZOLAM HYDROCHLORIDE 1 MG/ML
INJECTION, SOLUTION INTRAMUSCULAR; INTRAVENOUS AS NEEDED
Status: DISCONTINUED | OUTPATIENT
Start: 2019-05-24 | End: 2019-05-24 | Stop reason: HOSPADM

## 2019-05-24 RX ORDER — HYDROMORPHONE HYDROCHLORIDE 1 MG/ML
0.2 INJECTION, SOLUTION INTRAMUSCULAR; INTRAVENOUS; SUBCUTANEOUS
Status: DISCONTINUED | OUTPATIENT
Start: 2019-05-24 | End: 2019-05-24 | Stop reason: HOSPADM

## 2019-05-24 RX ORDER — SODIUM CHLORIDE, SODIUM LACTATE, POTASSIUM CHLORIDE, CALCIUM CHLORIDE 600; 310; 30; 20 MG/100ML; MG/100ML; MG/100ML; MG/100ML
25 INJECTION, SOLUTION INTRAVENOUS CONTINUOUS
Status: DISCONTINUED | OUTPATIENT
Start: 2019-05-24 | End: 2019-05-24 | Stop reason: HOSPADM

## 2019-05-24 RX ORDER — SUCCINYLCHOLINE CHLORIDE 20 MG/ML
INJECTION INTRAMUSCULAR; INTRAVENOUS AS NEEDED
Status: DISCONTINUED | OUTPATIENT
Start: 2019-05-24 | End: 2019-05-24 | Stop reason: HOSPADM

## 2019-05-24 RX ORDER — KETOROLAC TROMETHAMINE 30 MG/ML
INJECTION, SOLUTION INTRAMUSCULAR; INTRAVENOUS AS NEEDED
Status: DISCONTINUED | OUTPATIENT
Start: 2019-05-24 | End: 2019-05-24 | Stop reason: HOSPADM

## 2019-05-24 RX ORDER — IBUPROFEN 800 MG/1
800 TABLET ORAL
Qty: 30 TAB | Refills: 1 | Status: SHIPPED | OUTPATIENT
Start: 2019-05-24

## 2019-05-24 RX ORDER — OXYCODONE AND ACETAMINOPHEN 5; 325 MG/1; MG/1
1-2 TABLET ORAL
Qty: 15 TAB | Refills: 0 | Status: SHIPPED | OUTPATIENT
Start: 2019-05-24 | End: 2019-05-27

## 2019-05-24 RX ORDER — PROPOFOL 10 MG/ML
INJECTION, EMULSION INTRAVENOUS AS NEEDED
Status: DISCONTINUED | OUTPATIENT
Start: 2019-05-24 | End: 2019-05-24 | Stop reason: HOSPADM

## 2019-05-24 RX ORDER — ONDANSETRON 2 MG/ML
INJECTION INTRAMUSCULAR; INTRAVENOUS AS NEEDED
Status: DISCONTINUED | OUTPATIENT
Start: 2019-05-24 | End: 2019-05-24 | Stop reason: HOSPADM

## 2019-05-24 RX ORDER — ACETAMINOPHEN 500 MG
1000 TABLET ORAL ONCE
Status: DISCONTINUED | OUTPATIENT
Start: 2019-05-24 | End: 2019-05-24 | Stop reason: SDUPTHER

## 2019-05-24 RX ORDER — FENTANYL CITRATE 50 UG/ML
INJECTION, SOLUTION INTRAMUSCULAR; INTRAVENOUS AS NEEDED
Status: DISCONTINUED | OUTPATIENT
Start: 2019-05-24 | End: 2019-05-24 | Stop reason: HOSPADM

## 2019-05-24 RX ORDER — DIPHENHYDRAMINE HYDROCHLORIDE 50 MG/ML
12.5 INJECTION, SOLUTION INTRAMUSCULAR; INTRAVENOUS AS NEEDED
Status: DISCONTINUED | OUTPATIENT
Start: 2019-05-24 | End: 2019-05-24 | Stop reason: HOSPADM

## 2019-05-24 RX ORDER — SODIUM CHLORIDE 0.9 % (FLUSH) 0.9 %
5-40 SYRINGE (ML) INJECTION EVERY 8 HOURS
Status: DISCONTINUED | OUTPATIENT
Start: 2019-05-24 | End: 2019-05-24 | Stop reason: HOSPADM

## 2019-05-24 RX ORDER — LIDOCAINE HYDROCHLORIDE 10 MG/ML
0.1 INJECTION, SOLUTION EPIDURAL; INFILTRATION; INTRACAUDAL; PERINEURAL AS NEEDED
Status: DISCONTINUED | OUTPATIENT
Start: 2019-05-24 | End: 2019-05-24 | Stop reason: HOSPADM

## 2019-05-24 RX ORDER — MIDAZOLAM HYDROCHLORIDE 1 MG/ML
1 INJECTION, SOLUTION INTRAMUSCULAR; INTRAVENOUS AS NEEDED
Status: DISCONTINUED | OUTPATIENT
Start: 2019-05-24 | End: 2019-05-24 | Stop reason: HOSPADM

## 2019-05-24 RX ORDER — FENTANYL CITRATE 50 UG/ML
50 INJECTION, SOLUTION INTRAMUSCULAR; INTRAVENOUS AS NEEDED
Status: DISCONTINUED | OUTPATIENT
Start: 2019-05-24 | End: 2019-05-24 | Stop reason: HOSPADM

## 2019-05-24 RX ORDER — SODIUM CHLORIDE 0.9 % (FLUSH) 0.9 %
5-40 SYRINGE (ML) INJECTION AS NEEDED
Status: DISCONTINUED | OUTPATIENT
Start: 2019-05-24 | End: 2019-05-24 | Stop reason: HOSPADM

## 2019-05-24 RX ORDER — FENTANYL CITRATE 50 UG/ML
25 INJECTION, SOLUTION INTRAMUSCULAR; INTRAVENOUS
Status: DISCONTINUED | OUTPATIENT
Start: 2019-05-24 | End: 2019-05-24 | Stop reason: HOSPADM

## 2019-05-24 RX ORDER — LIDOCAINE HYDROCHLORIDE 10 MG/ML
0.1 INJECTION, SOLUTION EPIDURAL; INFILTRATION; INTRACAUDAL; PERINEURAL AS NEEDED
Status: DISCONTINUED | OUTPATIENT
Start: 2019-05-24 | End: 2019-05-24 | Stop reason: SDUPTHER

## 2019-05-24 RX ORDER — DEXAMETHASONE SODIUM PHOSPHATE 4 MG/ML
INJECTION, SOLUTION INTRA-ARTICULAR; INTRALESIONAL; INTRAMUSCULAR; INTRAVENOUS; SOFT TISSUE AS NEEDED
Status: DISCONTINUED | OUTPATIENT
Start: 2019-05-24 | End: 2019-05-24 | Stop reason: HOSPADM

## 2019-05-24 RX ORDER — LIDOCAINE HYDROCHLORIDE 20 MG/ML
INJECTION, SOLUTION EPIDURAL; INFILTRATION; INTRACAUDAL; PERINEURAL AS NEEDED
Status: DISCONTINUED | OUTPATIENT
Start: 2019-05-24 | End: 2019-05-24 | Stop reason: HOSPADM

## 2019-05-24 RX ORDER — ONDANSETRON 2 MG/ML
4 INJECTION INTRAMUSCULAR; INTRAVENOUS AS NEEDED
Status: DISCONTINUED | OUTPATIENT
Start: 2019-05-24 | End: 2019-05-24 | Stop reason: HOSPADM

## 2019-05-24 RX ORDER — GLYCOPYRROLATE 0.2 MG/ML
INJECTION INTRAMUSCULAR; INTRAVENOUS AS NEEDED
Status: DISCONTINUED | OUTPATIENT
Start: 2019-05-24 | End: 2019-05-24 | Stop reason: HOSPADM

## 2019-05-24 RX ORDER — ACETAMINOPHEN 500 MG
1000 TABLET ORAL ONCE
Status: COMPLETED | OUTPATIENT
Start: 2019-05-24 | End: 2019-05-24

## 2019-05-24 RX ORDER — SILVER NITRATE 38.21; 12.74 MG/1; MG/1
STICK TOPICAL AS NEEDED
Status: DISCONTINUED | OUTPATIENT
Start: 2019-05-24 | End: 2019-05-24 | Stop reason: HOSPADM

## 2019-05-24 RX ORDER — MORPHINE SULFATE 10 MG/ML
2 INJECTION, SOLUTION INTRAMUSCULAR; INTRAVENOUS
Status: DISCONTINUED | OUTPATIENT
Start: 2019-05-24 | End: 2019-05-24 | Stop reason: HOSPADM

## 2019-05-24 RX ADMIN — PROPOFOL 50 MG: 10 INJECTION, EMULSION INTRAVENOUS at 15:22

## 2019-05-24 RX ADMIN — LIDOCAINE HYDROCHLORIDE 80 MG: 20 INJECTION, SOLUTION EPIDURAL; INFILTRATION; INTRACAUDAL; PERINEURAL at 14:59

## 2019-05-24 RX ADMIN — MIDAZOLAM HYDROCHLORIDE 2 MG: 1 INJECTION, SOLUTION INTRAMUSCULAR; INTRAVENOUS at 14:54

## 2019-05-24 RX ADMIN — GLYCOPYRROLATE 0.4 MG: 0.2 INJECTION INTRAMUSCULAR; INTRAVENOUS at 15:08

## 2019-05-24 RX ADMIN — SODIUM CHLORIDE, SODIUM LACTATE, POTASSIUM CHLORIDE, AND CALCIUM CHLORIDE 25 ML/HR: 600; 310; 30; 20 INJECTION, SOLUTION INTRAVENOUS at 14:12

## 2019-05-24 RX ADMIN — ONDANSETRON 4 MG: 2 INJECTION INTRAMUSCULAR; INTRAVENOUS at 15:08

## 2019-05-24 RX ADMIN — DOXYCYCLINE 100 MG: 100 INJECTION, POWDER, LYOPHILIZED, FOR SOLUTION INTRAVENOUS at 14:55

## 2019-05-24 RX ADMIN — FENTANYL CITRATE 50 MCG: 50 INJECTION, SOLUTION INTRAMUSCULAR; INTRAVENOUS at 14:59

## 2019-05-24 RX ADMIN — KETOROLAC TROMETHAMINE 30 MG: 30 INJECTION, SOLUTION INTRAMUSCULAR; INTRAVENOUS at 15:25

## 2019-05-24 RX ADMIN — SUCCINYLCHOLINE CHLORIDE 160 MG: 20 INJECTION INTRAMUSCULAR; INTRAVENOUS at 14:59

## 2019-05-24 RX ADMIN — ACETAMINOPHEN 1000 MG: 500 TABLET ORAL at 14:13

## 2019-05-24 RX ADMIN — FENTANYL CITRATE 50 MCG: 50 INJECTION, SOLUTION INTRAMUSCULAR; INTRAVENOUS at 15:13

## 2019-05-24 RX ADMIN — DEXAMETHASONE SODIUM PHOSPHATE 4 MG: 4 INJECTION, SOLUTION INTRA-ARTICULAR; INTRALESIONAL; INTRAMUSCULAR; INTRAVENOUS; SOFT TISSUE at 15:08

## 2019-05-24 RX ADMIN — PROPOFOL 300 MG: 10 INJECTION, EMULSION INTRAVENOUS at 14:59

## 2019-05-24 NOTE — OP NOTES
OP NOTE      [facility]    DATE OF PROCEDURE:  5/24/2019    PREOPERATIVE DIAGNOSIS:  MENORRHAGIA   ENDOMETRIAL POLYPS    POSTOPERATIVE DIAGNOSIS:  MENORRHAGIA   ENDOMETRIAL POLYPS    PROCEDURE: Hysteroscopy, dilatation & curettage, polypectomy    SURGEON:  Jack Berman MD    ANESTHESIA: General endotracheal anesthesia. EBL:  minimal    FINDINGS: Normal contour of uterine cavity, endometrial polyp extending from fundus to lower segment, otherwise shaggy stimulated appearing endometrium. Both tubal ostia visualized. The perineum is noted to be somewhat raw appearing, falling short of being denuded and without evidence of fungal infection, but raw from overuse of menstrual products. Specimen:  Endometrial polyp, endometrial curettings    PROCEDURE: Patient was placed on the operating table in the supine position. Time out was done to confirm the operating procedure, surgeon, patient and site. Once confirmed by the team, procedure was started. Patient was placed under general endotracheal anesthesia. She was prepped and draped in the usual fashion for vaginal surgery. The bladder was drained. Cervix was visualized with the aid of a sidearm speculum and grasped with a single-tooth tenaculum and sounded to 10 cm. The cervix was then dilated to 23-Maltese. The Myosure hysteroscope was then placed in the endometrial cavity. Findings were noted as above. The myosure device was used to resect the endometrial polyp and smooth out the shaggy appearing endometrium. The myosure yield is sent as endometrial polyp. Thorough endometrial curettage was then performed with endometrial curettings being sent for histologic review, using a medium sharp curette,  Until good uterine cry is noted in all quadrants. There was no bleeding from the os. Some bleeding was noted at the tenaculum site which was controlled with silver nitrate. Instruments were removed.  The patient went to the recovery room in satisfactory condition.

## 2019-05-24 NOTE — DISCHARGE INSTRUCTIONS
After Care Instructions For Your D&C      1. You may resume your usual diet once the nausea resolves. Initially, try sips of warm fluids and a bland diet. 2. Avoid heavy lifting and straining. Gradually increase your activity. First, try walking and doing light activity around the house. Resume your normal habits if no significant discomfort or bleeding develops. Most women can return to work within one to four days after this procedure. 3. You may take showers. Avoid using a tub bath, swimming pool or hot tub until after your check-up. 4. Do not place anything in your vagina until after your postoperative visit. Do not   douche, use tampons, or have intercourse because this may cause bleeding and   infection. 5. You may initially experience a heavy bloody discharge. This should not be more than your menstrual flow. Over the next several days, the flow should steadily decrease. 6. Typically following the procedure, there is little or no pain. You may feel cramps in your lower abdomen. Tylenol may relieve mild cramping. If pain medication does not improve your symptoms, you should contact your physician. 7. Contact the office if you have excessive bleeding (saturating a pad an hour for two hours or passing large clots). It is also necessary to speak with your physician if you develop chills, a temperature greater than 100.4, difficulty voiding or burning on urination. 8. Your physician may want to see you in the office after your D&C. Please call for an appointment if this has not already been arranged. Our office phone number is [phone]. If appropriate, the microscopic results from your procedure will be discussed at this follow-up visit. 9.  Your perineum does not show signs of fungal infection or actual skin breakdown, but does appear raw related to sanitary products, as we had discussed before surgery.   I would recommend keeping this area covered with A+D ointment until it heals, and you will notice when it begins to feel better. I do not think you need any prescription therapy for this. {Medication reconciliation information is now added to the patient's AVS automatically when it is printed. There is no need to use this SmartLink in discharge instructions. Highlight this text and delete it to clear this message}      DISCHARGE SUMMARY from Nurse    PATIENT INSTRUCTIONS:    After general anesthesia or intravenous sedation, for 24 hours or while taking prescription Narcotics:  · Limit your activities  · Do not drive and operate hazardous machinery  · Do not make important personal or business decisions  · Do  not drink alcoholic beverages  · If you have not urinated within 8 hours after discharge, please contact your surgeon on call. Report the following to your surgeon:  · Excessive pain, swelling, redness or odor of or around the surgical area  · Temperature over 100.5  · Nausea and vomiting lasting longer than 4 hours or if unable to take medications  · Any signs of decreased circulation or nerve impairment to extremity: change in color, persistent  numbness, tingling, coldness or increase pain    *  Please give a list of your current medications to your Primary Care Provider. *  Please update this list whenever your medications are discontinued, doses are      changed, or new medications (including over-the-counter products) are added. *  Please carry medication information at all times in case of emergency situations. These are general instructions for a healthy lifestyle:    No smoking/ No tobacco products/ Avoid exposure to second hand smoke  Surgeon General's Warning:  Quitting smoking now greatly reduces serious risk to your health.     Obesity, smoking, and sedentary lifestyle greatly increases your risk for illness    A healthy diet, regular physical exercise & weight monitoring are important for maintaining a healthy lifestyle    You may be retaining fluid if you have a history of heart failure or if you experience any of the following symptoms:  Weight gain of 3 pounds or more overnight or 5 pounds in a week, increased swelling in our hands or feet or shortness of breath while lying flat in bed. Please call your doctor as soon as you notice any of these symptoms; do not wait until your next office visit. Recognize signs and symptoms of STROKE:    F-face looks uneven    A-arms unable to move or move unevenly    S-speech slurred or non-existent    T-time-call 911 as soon as signs and symptoms begin-DO NOT go       Back to bed or wait to see if you get better-TIME IS BRAIN. Warning Signs of HEART ATTACK     Call 911 if you have these symptoms:   Chest discomfort. Most heart attacks involve discomfort in the center of the chest that lasts more than a few minutes, or that goes away and comes back. It can feel like uncomfortable pressure, squeezing, fullness, or pain.  Discomfort in other areas of the upper body. Symptoms can include pain or discomfort in one or both arms, the back, neck, jaw, or stomach.  Shortness of breath with or without chest discomfort.  Other signs may include breaking out in a cold sweat, nausea, or lightheadedness. Don't wait more than five minutes to call 911 - MINUTES MATTER! Fast action can save your life. Calling 911 is almost always the fastest way to get lifesaving treatment. Emergency Medical Services staff can begin treatment when they arrive -- up to an hour sooner than if someone gets to the hospital by car. The discharge information has been reviewed with the patient and Guyann Lay. The patient and Guyann Lay verbalized understanding. Discharge medications reviewed with the patient and Guyann Lay appropriate educational materials and side effects teaching were provided.   ___________________________________________________________________________________________________________________________________

## 2019-05-24 NOTE — PERIOP NOTES
Discharge instructions reviewed with patient & , both verbalize understanding.  Pt transferred via w/c discharged to home

## 2019-05-24 NOTE — PERIOP NOTES
Handoff Report from Operating Room to PACU    Report received from Sunday Cueva and [facility] [address] [facility] regarding [facility]. Surgeon(s):  Edwige Faith MD  And Procedure(s) (LRB):  HYSTEROSCOPY, POLYPECTOMY, DILATATION AND CURETTAGE, POSSIBLE MYOSURE (CHOICE ANES) (N/A)  confirmed   with allergies and dressings discussed. Anesthesia type, drugs, patient history, complications, estimated blood loss, vital signs, intake and output, and last pain medication and reversal medications were reviewed.

## 2022-03-19 PROBLEM — F99 PSYCHIATRIC DISORDER: Status: ACTIVE | Noted: 2017-06-19

## 2022-03-19 PROBLEM — N92.0 MENORRHAGIA: Status: ACTIVE | Noted: 2019-05-24

## 2022-03-19 PROBLEM — J45.20 MILD INTERMITTENT ASTHMA WITHOUT COMPLICATION: Status: ACTIVE | Noted: 2017-06-19

## 2022-03-19 PROBLEM — N84.0 ENDOMETRIAL POLYP: Status: ACTIVE | Noted: 2019-05-24

## 2022-12-14 ENCOUNTER — TRANSCRIBE ORDER (OUTPATIENT)
Dept: SCHEDULING | Age: 37
End: 2022-12-14

## 2022-12-14 DIAGNOSIS — M89.9 BONE LESION: Primary | ICD-10-CM

## 2023-05-20 RX ORDER — LAMOTRIGINE 50 MG/1
100 TABLET, EXTENDED RELEASE ORAL DAILY
COMMUNITY

## 2023-05-20 RX ORDER — LEVOCETIRIZINE DIHYDROCHLORIDE 2.5 MG/5ML
SOLUTION ORAL
COMMUNITY

## 2023-05-20 RX ORDER — IBUPROFEN 800 MG/1
800 TABLET ORAL EVERY 8 HOURS PRN
COMMUNITY
Start: 2019-05-24

## 2023-05-20 RX ORDER — DEXTROAMPHETAMINE SACCHARATE, AMPHETAMINE ASPARTATE MONOHYDRATE, DEXTROAMPHETAMINE SULFATE AND AMPHETAMINE SULFATE 2.5; 2.5; 2.5; 2.5 MG/1; MG/1; MG/1; MG/1
20 CAPSULE, EXTENDED RELEASE ORAL
COMMUNITY

## 2023-05-20 RX ORDER — MONTELUKAST SODIUM 10 MG/1
10 TABLET ORAL DAILY
COMMUNITY

## 2023-06-07 NOTE — ANESTHESIA POSTPROCEDURE EVALUATION
Procedure(s): HYSTEROSCOPY, POLYPECTOMY, DILATATION AND CURETTAGE, POSSIBLE MYOSURE (CHOICE ANES). general    Anesthesia Post Evaluation        Patient location during evaluation: PACU  Note status: Adequate. Level of consciousness: responsive to verbal stimuli and sleepy but conscious  Pain management: satisfactory to patient  Airway patency: patent  Anesthetic complications: no  Cardiovascular status: acceptable  Respiratory status: acceptable  Hydration status: acceptable  Comments: +Post-Anesthesia Evaluation and Assessment    Patient: Rose Mary Casillas MRN: [id number]  SSN: [id number]   YOB: 1985  Age: 29 y.o. Sex: female      Cardiovascular Function/Vital Signs    /69   Pulse 84   Temp 36.9 °C (98.4 °F)   Resp 15   Ht 5' 10\" (1.778 m)   Wt (!) 161.2 kg (355 lb 6.1 oz)   SpO2 99%   BMI 50.99 kg/m²     Patient is status post Procedure(s): HYSTEROSCOPY, POLYPECTOMY, DILATATION AND CURETTAGE, POSSIBLE MYOSURE (CHOICE ANES). Nausea/Vomiting: Controlled. Postoperative hydration reviewed and adequate. Pain:  Pain Scale 1: Numeric (0 - 10) (05/24/19 1609)  Pain Intensity 1: 2 (05/24/19 1609)   Managed. Neurological Status:   Neuro (WDL): Within Defined Limits (05/24/19 1402)   At baseline. Mental Status and Level of Consciousness: Arousable. Pulmonary Status:   O2 Device: Room air (05/24/19 1615)   Adequate oxygenation and airway patent. Complications related to anesthesia: None    Post-anesthesia assessment completed. No concerns. Signed By: Iris Barahona MD    5/24/2019  Post anesthesia nausea and vomiting:  controlled      Vitals Value Taken Time   /67 5/24/2019  4:30 PM   Temp 36.9 °C (98.4 °F) 5/24/2019  4:15 PM   Pulse 75 5/24/2019  4:31 PM   Resp 12 5/24/2019  4:31 PM   SpO2 97 % 5/24/2019  4:31 PM   Vitals shown include unvalidated device data. sharp

## (undated) DEVICE — Y-TUBING SET FOR SUCTION: Brand: N.A.

## (undated) DEVICE — REM POLYHESIVE ADULT PATIENT RETURN ELECTRODE: Brand: VALLEYLAB

## (undated) DEVICE — INFECTION CONTROL KIT SYS

## (undated) DEVICE — GOWN,SIRUS,FABRNF,XL,20/CS: Brand: MEDLINE

## (undated) DEVICE — KENDALL SCD EXPRESS SLEEVES, KNEE LENGTH, MEDIUM: Brand: KENDALL SCD

## (undated) DEVICE — PAD,SANITARY,11 IN,MAXI,N-STRL,IND WRAP: Brand: MEDLINE

## (undated) DEVICE — TUBING SET, HAMOU ENDOMAT(IRRIGATION)HYS: Brand: N.A.

## (undated) DEVICE — DEVICE TISS REMOVAL -- ORDER AS BX     APO CODE = DRP

## (undated) DEVICE — STERILE POLYISOPRENE POWDER-FREE SURGICAL GLOVES: Brand: PROTEXIS

## (undated) DEVICE — SOLUTION SCRB 2OZ 10% POVIDONE IOD ANTIMIC BTL

## (undated) DEVICE — BAG COLLECTION FLD OR-TBL NS --

## (undated) DEVICE — DRAPE,REIN 53X77,STERILE: Brand: MEDLINE

## (undated) DEVICE — HANDLE LT SNAP ON ULT DURABLE LENS FOR TRUMPF ALC DISPOSABLE

## (undated) DEVICE — CATH URETH INTMIT ROB 14FR FUN -- USE ITEM 179521

## (undated) DEVICE — TOWEL SURG W17XL27IN STD BLU COT NONFENESTRATED PREWASHED

## (undated) DEVICE — PACK,LITHOTOMY,PK I: Brand: MEDLINE

## (undated) DEVICE — STRAP POS KNEE BODY VELC

## (undated) DEVICE — PREP PAD BNS: Brand: CONVERTORS

## (undated) DEVICE — Device